# Patient Record
Sex: FEMALE | Race: OTHER | HISPANIC OR LATINO | URBAN - METROPOLITAN AREA
[De-identification: names, ages, dates, MRNs, and addresses within clinical notes are randomized per-mention and may not be internally consistent; named-entity substitution may affect disease eponyms.]

---

## 2018-08-01 ENCOUNTER — EMERGENCY (EMERGENCY)
Facility: HOSPITAL | Age: 33
LOS: 1 days | Discharge: HOME | End: 2018-08-01
Attending: EMERGENCY MEDICINE
Payer: COMMERCIAL

## 2018-08-01 VITALS
RESPIRATION RATE: 18 BRPM | TEMPERATURE: 98 F | OXYGEN SATURATION: 100 % | SYSTOLIC BLOOD PRESSURE: 105 MMHG | HEART RATE: 73 BPM | DIASTOLIC BLOOD PRESSURE: 68 MMHG

## 2018-08-01 LAB
ANION GAP SERPL CALC-SCNC: 6 MMOL/L — SIGNIFICANT CHANGE UP (ref 5–17)
APPEARANCE UR: CLEAR — SIGNIFICANT CHANGE UP
BASOPHILS # BLD AUTO: 0.1 K/UL — SIGNIFICANT CHANGE UP (ref 0–0.2)
BASOPHILS NFR BLD AUTO: 0.7 % — SIGNIFICANT CHANGE UP (ref 0–2)
BILIRUB UR-MCNC: NEGATIVE — SIGNIFICANT CHANGE UP
BUN SERPL-MCNC: 12 MG/DL — SIGNIFICANT CHANGE UP (ref 7–18)
CALCIUM SERPL-MCNC: 9.3 MG/DL — SIGNIFICANT CHANGE UP (ref 8.4–10.5)
CHLORIDE SERPL-SCNC: 104 MMOL/L — SIGNIFICANT CHANGE UP (ref 96–108)
CO2 SERPL-SCNC: 27 MMOL/L — SIGNIFICANT CHANGE UP (ref 22–31)
COLOR SPEC: YELLOW — SIGNIFICANT CHANGE UP
CREAT SERPL-MCNC: 0.66 MG/DL — SIGNIFICANT CHANGE UP (ref 0.5–1.3)
DIFF PNL FLD: ABNORMAL
EOSINOPHIL # BLD AUTO: 0 K/UL — SIGNIFICANT CHANGE UP (ref 0–0.5)
EOSINOPHIL NFR BLD AUTO: 0.3 % — SIGNIFICANT CHANGE UP (ref 0–6)
GLUCOSE SERPL-MCNC: 108 MG/DL — HIGH (ref 70–99)
GLUCOSE UR QL: NEGATIVE — SIGNIFICANT CHANGE UP
HCG SERPL-ACNC: <1 MIU/ML — SIGNIFICANT CHANGE UP
HCT VFR BLD CALC: 38.4 % — SIGNIFICANT CHANGE UP (ref 34.5–45)
HGB BLD-MCNC: 12.6 G/DL — SIGNIFICANT CHANGE UP (ref 11.5–15.5)
KETONES UR-MCNC: NEGATIVE — SIGNIFICANT CHANGE UP
LEUKOCYTE ESTERASE UR-ACNC: NEGATIVE — SIGNIFICANT CHANGE UP
LYMPHOCYTES # BLD AUTO: 1.5 K/UL — SIGNIFICANT CHANGE UP (ref 1–3.3)
LYMPHOCYTES # BLD AUTO: 20.1 % — SIGNIFICANT CHANGE UP (ref 13–44)
MCHC RBC-ENTMCNC: 27.5 PG — SIGNIFICANT CHANGE UP (ref 27–34)
MCHC RBC-ENTMCNC: 32.9 GM/DL — SIGNIFICANT CHANGE UP (ref 32–36)
MCV RBC AUTO: 83.5 FL — SIGNIFICANT CHANGE UP (ref 80–100)
MONOCYTES # BLD AUTO: 0.5 K/UL — SIGNIFICANT CHANGE UP (ref 0–0.9)
MONOCYTES NFR BLD AUTO: 6.4 % — SIGNIFICANT CHANGE UP (ref 2–14)
NEUTROPHILS # BLD AUTO: 5.5 K/UL — SIGNIFICANT CHANGE UP (ref 1.8–7.4)
NEUTROPHILS NFR BLD AUTO: 72.5 % — SIGNIFICANT CHANGE UP (ref 43–77)
NITRITE UR-MCNC: NEGATIVE — SIGNIFICANT CHANGE UP
PH UR: 7 — SIGNIFICANT CHANGE UP (ref 5–8)
PLATELET # BLD AUTO: 212 K/UL — SIGNIFICANT CHANGE UP (ref 150–400)
POTASSIUM SERPL-MCNC: 3.8 MMOL/L — SIGNIFICANT CHANGE UP (ref 3.5–5.3)
POTASSIUM SERPL-SCNC: 3.8 MMOL/L — SIGNIFICANT CHANGE UP (ref 3.5–5.3)
PROT UR-MCNC: NEGATIVE — SIGNIFICANT CHANGE UP
RBC # BLD: 4.59 M/UL — SIGNIFICANT CHANGE UP (ref 3.8–5.2)
RBC # FLD: 12.9 % — SIGNIFICANT CHANGE UP (ref 10.3–14.5)
SODIUM SERPL-SCNC: 137 MMOL/L — SIGNIFICANT CHANGE UP (ref 135–145)
SP GR SPEC: 1 — LOW (ref 1.01–1.02)
TROPONIN I SERPL-MCNC: <0.015 NG/ML — SIGNIFICANT CHANGE UP (ref 0–0.04)
UROBILINOGEN FLD QL: NEGATIVE — SIGNIFICANT CHANGE UP
WBC # BLD: 7.6 K/UL — SIGNIFICANT CHANGE UP (ref 3.8–10.5)
WBC # FLD AUTO: 7.6 K/UL — SIGNIFICANT CHANGE UP (ref 3.8–10.5)

## 2018-08-01 PROCEDURE — 96374 THER/PROPH/DIAG INJ IV PUSH: CPT

## 2018-08-01 PROCEDURE — 84702 CHORIONIC GONADOTROPIN TEST: CPT

## 2018-08-01 PROCEDURE — 71046 X-RAY EXAM CHEST 2 VIEWS: CPT

## 2018-08-01 PROCEDURE — 85027 COMPLETE CBC AUTOMATED: CPT

## 2018-08-01 PROCEDURE — 93005 ELECTROCARDIOGRAM TRACING: CPT

## 2018-08-01 PROCEDURE — 96375 TX/PRO/DX INJ NEW DRUG ADDON: CPT

## 2018-08-01 PROCEDURE — 71046 X-RAY EXAM CHEST 2 VIEWS: CPT | Mod: 26

## 2018-08-01 PROCEDURE — 84484 ASSAY OF TROPONIN QUANT: CPT

## 2018-08-01 PROCEDURE — 99285 EMERGENCY DEPT VISIT HI MDM: CPT | Mod: 25

## 2018-08-01 PROCEDURE — 80048 BASIC METABOLIC PNL TOTAL CA: CPT

## 2018-08-01 PROCEDURE — 82962 GLUCOSE BLOOD TEST: CPT

## 2018-08-01 PROCEDURE — 81001 URINALYSIS AUTO W/SCOPE: CPT

## 2018-08-01 PROCEDURE — 99284 EMERGENCY DEPT VISIT MOD MDM: CPT | Mod: 25

## 2018-08-01 RX ORDER — DIPHENHYDRAMINE HCL 50 MG
25 CAPSULE ORAL ONCE
Qty: 0 | Refills: 0 | Status: COMPLETED | OUTPATIENT
Start: 2018-08-01 | End: 2018-08-01

## 2018-08-01 RX ORDER — KETOROLAC TROMETHAMINE 30 MG/ML
30 SYRINGE (ML) INJECTION ONCE
Qty: 0 | Refills: 0 | Status: DISCONTINUED | OUTPATIENT
Start: 2018-08-01 | End: 2018-08-01

## 2018-08-01 RX ORDER — METOCLOPRAMIDE HCL 10 MG
10 TABLET ORAL ONCE
Qty: 0 | Refills: 0 | Status: COMPLETED | OUTPATIENT
Start: 2018-08-01 | End: 2018-08-01

## 2018-08-01 RX ORDER — SODIUM CHLORIDE 9 MG/ML
1000 INJECTION INTRAMUSCULAR; INTRAVENOUS; SUBCUTANEOUS ONCE
Qty: 0 | Refills: 0 | Status: COMPLETED | OUTPATIENT
Start: 2018-08-01 | End: 2018-08-01

## 2018-08-01 RX ADMIN — Medication 25 MILLIGRAM(S): at 02:06

## 2018-08-01 RX ADMIN — Medication 10 MILLIGRAM(S): at 02:06

## 2018-08-01 RX ADMIN — SODIUM CHLORIDE 1000 MILLILITER(S): 9 INJECTION INTRAMUSCULAR; INTRAVENOUS; SUBCUTANEOUS at 01:02

## 2018-08-01 RX ADMIN — Medication 30 MILLIGRAM(S): at 02:05

## 2018-08-01 RX ADMIN — Medication 30 MILLIGRAM(S): at 02:09

## 2018-08-01 RX ADMIN — SODIUM CHLORIDE 1000 MILLILITER(S): 9 INJECTION INTRAMUSCULAR; INTRAVENOUS; SUBCUTANEOUS at 02:06

## 2018-08-01 NOTE — ED PROVIDER NOTE - OBJECTIVE STATEMENT
34 y/o F with no significant PMHx presents to ED after having an episode of headache and lightheadedness while at work. Pt describes pain to be at the back of her head, gradual onset, not thunderclap in nature, w/ associated nausea, fatigue. Pt denies any vomiting, LOC, shortness of breath, abd pain, urinary or bowel changes, or any other complaints. Pt is with coworker who states she is more comfortable after fluids. Denies history of syncope in past. Pt declines translation services, is opting for translation by friends and family.    32 y/o F with no significant PMHx presents to ED after having an episode of headache and lightheadedness while at work. Pt describes pain to be at the back of her head, gradual onset, not thunderclap in nature, w/ associated nausea, fatigue. Pt denies any vomiting, LOC, shortness of breath, abd pain, urinary or bowel changes, or any other complaints. Pt is with coworker who states she is more comfortable after fluids. Denies history of syncope in past.

## 2018-08-01 NOTE — ED PROVIDER NOTE - MEDICAL DECISION MAKING DETAILS
34 y/o F here w/ headache and lightheadedness. Will evaluate w/ cardiac work up and treat w/ migraine cocktail.

## 2018-08-01 NOTE — ED PROVIDER NOTE - CARE PLAN
Principal Discharge DX:	Acute nonintractable headache, unspecified headache type
Patient declined information

## 2018-08-01 NOTE — ED PROVIDER NOTE - PROGRESS NOTE DETAILS
X-ray was read as a wet read, no obvious pna noted. Patient without any complaints at this time. Patient requesting D/C. Will d/c home with return instructions.

## 2019-10-06 ENCOUNTER — EMERGENCY (EMERGENCY)
Facility: HOSPITAL | Age: 34
LOS: 1 days | Discharge: HOME | End: 2019-10-06
Attending: EMERGENCY MEDICINE
Payer: MEDICAID

## 2019-10-06 VITALS
DIASTOLIC BLOOD PRESSURE: 85 MMHG | SYSTOLIC BLOOD PRESSURE: 125 MMHG | TEMPERATURE: 98 F | RESPIRATION RATE: 16 BRPM | OXYGEN SATURATION: 97 % | HEART RATE: 84 BPM | WEIGHT: 130.95 LBS | HEIGHT: 60 IN

## 2019-10-06 VITALS
DIASTOLIC BLOOD PRESSURE: 68 MMHG | RESPIRATION RATE: 16 BRPM | TEMPERATURE: 98 F | SYSTOLIC BLOOD PRESSURE: 112 MMHG | OXYGEN SATURATION: 98 % | HEART RATE: 68 BPM

## 2019-10-06 LAB
ALBUMIN SERPL ELPH-MCNC: 3.7 G/DL — SIGNIFICANT CHANGE UP (ref 3.5–5)
ALP SERPL-CCNC: 75 U/L — SIGNIFICANT CHANGE UP (ref 40–120)
ALT FLD-CCNC: 20 U/L DA — SIGNIFICANT CHANGE UP (ref 10–60)
ANION GAP SERPL CALC-SCNC: 6 MMOL/L — SIGNIFICANT CHANGE UP (ref 5–17)
APTT BLD: 34 SEC — SIGNIFICANT CHANGE UP (ref 27.5–36.3)
AST SERPL-CCNC: 19 U/L — SIGNIFICANT CHANGE UP (ref 10–40)
BASOPHILS # BLD AUTO: 0 K/UL — SIGNIFICANT CHANGE UP (ref 0–0.2)
BASOPHILS NFR BLD AUTO: 0 % — SIGNIFICANT CHANGE UP (ref 0–2)
BILIRUB SERPL-MCNC: 0.2 MG/DL — SIGNIFICANT CHANGE UP (ref 0.2–1.2)
BUN SERPL-MCNC: 9 MG/DL — SIGNIFICANT CHANGE UP (ref 7–18)
CALCIUM SERPL-MCNC: 9.3 MG/DL — SIGNIFICANT CHANGE UP (ref 8.4–10.5)
CHLORIDE SERPL-SCNC: 107 MMOL/L — SIGNIFICANT CHANGE UP (ref 96–108)
CO2 SERPL-SCNC: 27 MMOL/L — SIGNIFICANT CHANGE UP (ref 22–31)
CREAT SERPL-MCNC: 0.57 MG/DL — SIGNIFICANT CHANGE UP (ref 0.5–1.3)
EOSINOPHIL # BLD AUTO: 0.16 K/UL — SIGNIFICANT CHANGE UP (ref 0–0.5)
EOSINOPHIL NFR BLD AUTO: 2 % — SIGNIFICANT CHANGE UP (ref 0–6)
GLUCOSE SERPL-MCNC: 116 MG/DL — HIGH (ref 70–99)
HCT VFR BLD CALC: 41.2 % — SIGNIFICANT CHANGE UP (ref 34.5–45)
HGB BLD-MCNC: 13.1 G/DL — SIGNIFICANT CHANGE UP (ref 11.5–15.5)
HYPOCHROMIA BLD QL: SLIGHT — SIGNIFICANT CHANGE UP
INR BLD: 1.04 RATIO — SIGNIFICANT CHANGE UP (ref 0.88–1.16)
LIDOCAIN IGE QN: 135 U/L — SIGNIFICANT CHANGE UP (ref 73–393)
LYMPHOCYTES # BLD AUTO: 1.63 K/UL — SIGNIFICANT CHANGE UP (ref 1–3.3)
LYMPHOCYTES # BLD AUTO: 21 % — SIGNIFICANT CHANGE UP (ref 13–44)
MAGNESIUM SERPL-MCNC: 2.4 MG/DL — SIGNIFICANT CHANGE UP (ref 1.6–2.6)
MANUAL SMEAR VERIFICATION: SIGNIFICANT CHANGE UP
MCHC RBC-ENTMCNC: 26.6 PG — LOW (ref 27–34)
MCHC RBC-ENTMCNC: 31.8 GM/DL — LOW (ref 32–36)
MCV RBC AUTO: 83.7 FL — SIGNIFICANT CHANGE UP (ref 80–100)
MONOCYTES # BLD AUTO: 0.62 K/UL — SIGNIFICANT CHANGE UP (ref 0–0.9)
MONOCYTES NFR BLD AUTO: 8 % — SIGNIFICANT CHANGE UP (ref 2–14)
NEUTROPHILS # BLD AUTO: 5.35 K/UL — SIGNIFICANT CHANGE UP (ref 1.8–7.4)
NEUTROPHILS NFR BLD AUTO: 69 % — SIGNIFICANT CHANGE UP (ref 43–77)
NRBC # BLD: 0 /100 — SIGNIFICANT CHANGE UP (ref 0–0)
NT-PROBNP SERPL-SCNC: 14 PG/ML — SIGNIFICANT CHANGE UP (ref 0–125)
PLAT MORPH BLD: NORMAL — SIGNIFICANT CHANGE UP
PLATELET # BLD AUTO: 246 K/UL — SIGNIFICANT CHANGE UP (ref 150–400)
POTASSIUM SERPL-MCNC: 4.2 MMOL/L — SIGNIFICANT CHANGE UP (ref 3.5–5.3)
POTASSIUM SERPL-SCNC: 4.2 MMOL/L — SIGNIFICANT CHANGE UP (ref 3.5–5.3)
PROT SERPL-MCNC: 7.7 G/DL — SIGNIFICANT CHANGE UP (ref 6–8.3)
PROTHROM AB SERPL-ACNC: 11.6 SEC — SIGNIFICANT CHANGE UP (ref 10–12.9)
RBC # BLD: 4.92 M/UL — SIGNIFICANT CHANGE UP (ref 3.8–5.2)
RBC # FLD: 14.1 % — SIGNIFICANT CHANGE UP (ref 10.3–14.5)
RBC BLD AUTO: ABNORMAL
SODIUM SERPL-SCNC: 140 MMOL/L — SIGNIFICANT CHANGE UP (ref 135–145)
TROPONIN I SERPL-MCNC: <0.015 NG/ML — SIGNIFICANT CHANGE UP (ref 0–0.04)
TROPONIN I SERPL-MCNC: <0.015 NG/ML — SIGNIFICANT CHANGE UP (ref 0–0.04)
WBC # BLD: 7.76 K/UL — SIGNIFICANT CHANGE UP (ref 3.8–10.5)
WBC # FLD AUTO: 7.76 K/UL — SIGNIFICANT CHANGE UP (ref 3.8–10.5)

## 2019-10-06 PROCEDURE — 99283 EMERGENCY DEPT VISIT LOW MDM: CPT | Mod: 25

## 2019-10-06 PROCEDURE — 85610 PROTHROMBIN TIME: CPT

## 2019-10-06 PROCEDURE — 93005 ELECTROCARDIOGRAM TRACING: CPT

## 2019-10-06 PROCEDURE — 85730 THROMBOPLASTIN TIME PARTIAL: CPT

## 2019-10-06 PROCEDURE — 93010 ELECTROCARDIOGRAM REPORT: CPT

## 2019-10-06 PROCEDURE — 84484 ASSAY OF TROPONIN QUANT: CPT

## 2019-10-06 PROCEDURE — 83880 ASSAY OF NATRIURETIC PEPTIDE: CPT

## 2019-10-06 PROCEDURE — 80053 COMPREHEN METABOLIC PANEL: CPT

## 2019-10-06 PROCEDURE — 36415 COLL VENOUS BLD VENIPUNCTURE: CPT

## 2019-10-06 PROCEDURE — 99284 EMERGENCY DEPT VISIT MOD MDM: CPT

## 2019-10-06 PROCEDURE — 83735 ASSAY OF MAGNESIUM: CPT

## 2019-10-06 PROCEDURE — 71046 X-RAY EXAM CHEST 2 VIEWS: CPT

## 2019-10-06 PROCEDURE — 83690 ASSAY OF LIPASE: CPT

## 2019-10-06 PROCEDURE — 71046 X-RAY EXAM CHEST 2 VIEWS: CPT | Mod: 26

## 2019-10-06 PROCEDURE — 85027 COMPLETE CBC AUTOMATED: CPT

## 2019-10-06 NOTE — ED PROVIDER NOTE - NSFOLLOWUPCLINICS_GEN_ALL_ED_FT
[facility]  Cardiology  [address] [place] [id number]  Phone: [phone]  Fax:   Follow Up Time:

## 2019-10-06 NOTE — ED PROVIDER NOTE - PATIENT PORTAL LINK FT
You can access the FollowMyHealth Patient Portal offered by [facility] by registering at the following website: [email or web address]. By joining [employer]’s FollowMyHealth portal, you will also be able to view your health information using other applications (apps) compatible with our system.

## 2019-10-06 NOTE — ED PROVIDER NOTE - NSFOLLOWUPINSTRUCTIONS_ED_ALL_ED_FT
FOLLOW UP WITH PMD & CARDIOLOGIST  WITHIN 1-2DAYS, CALL TO MAKE APPOINTMENT  COME BACK TO ED IF YOUR CONDITION WORSENS OR IF YOU DEVELOP FEVER GREATER THAN 100.4F, CHEST PAIN,  SHORTNESS OF BREATH OR ANY OTHER SYMPTOMS CONCERNING TO YOU  TAKE TYLENOL (ACETAMINOPHEN) 650 MG EVERY 6 HOURS AS NEEDED FOR PAIN  TAKE IBUPROFEN (MOTRIN)  600 MG EVERY 6 HOURS AS NEEDED FOR PAIN  IF YOU WERE PRESRCIBED ANY MEDICATIONS FROM TODAY'S VISIT, TAKE THEM AS DIRECTED    Chest Pain    WHAT YOU NEED TO KNOW:    Chest pain can be caused by a range of conditions, from not serious to life-threatening. Chest pain can be a symptom of a digestive problem, such as acid reflux or a stomach ulcer. An anxiety attack or a strong emotion, such as anger, can also cause chest pain. Infection, inflammation, or a fracture in the bones or cartilage in your chest can cause pain or discomfort. Sometimes chest pain or pressure is caused by poor blood flow to your heart (angina). Chest pain may also be caused by life-threatening conditions such as a heart attack or blood clot in your lungs.     DISCHARGE INSTRUCTIONS:    Call 911 if:     You have any of the following signs of a heart attack:   Squeezing, pressure, or pain in your chest       and any of the following:   Discomfort or pain in your back, neck, jaw, stomach, or arm       Shortness of breath      Nausea or vomiting      Lightheadedness or a sudden cold sweat        Return to the emergency department if:     You have chest discomfort that gets worse, even with medicine.      You cough or vomit blood.       Your bowel movements are black or bloody.       You cannot stop vomiting, or it hurts to swallow.     Contact your healthcare provider if:     You have questions or concerns about your condition or care.        Medicines:     Medicines may be given to treat the cause of your chest pain. Examples include pain medicine, anxiety medicine, or medicines to increase blood flow to your heart.       Do not take certain medicines without asking your healthcare provider first. These include NSAIDs, herbal or vitamin supplements, or hormones (estrogen or progestin).       Take your medicine as directed. Contact your healthcare provider if you think your medicine is not helping or if you have side effects. Tell him or her if you are allergic to any medicine. Keep a list of the medicines, vitamins, and herbs you take. Include the amounts, and when and why you take them. Bring the list or the pill bottles to follow-up visits. Carry your medicine list with you in case of an emergency.    Follow up with your healthcare provider within 72 hours, or as directed: You may need to return for more tests to find the cause of your chest pain. You may be referred to a specialist, such as a cardiologist or gastroenterologist. Write down your questions so you remember to ask them during your visits.     Healthy living tips: The following are general healthy guidelines. If your chest pain is caused by a heart problem, your healthcare provider will give you specific guidelines to follow.    Do not smoke. Nicotine and other chemicals in cigarettes and cigars can cause lung and heart damage. Ask your healthcare provider for information if you currently smoke and need help to quit. E-cigarettes or smokeless tobacco still contain nicotine. Talk to your healthcare provider before you use these products.       Eat a variety of healthy, low-fat, low-salt foods. Healthy foods include fruits, vegetables, whole-grain breads, low-fat dairy products, beans, lean meats, and fish. Ask for more information about a heart healthy diet.      Drink plenty of water every day. Your body is made of mostly water. Water helps your body to control your temperature and blood pressure. Ask your healthcare provider how much water you should drink every day.      Ask about activity. Your healthcare provider will tell you which activities to limit or avoid. Ask when you can drive, return to work, and have sex. Ask about the best exercise plan for you.      Maintain a healthy weight. Ask your healthcare provider how much you should weigh. Ask him or her to help you create a weight loss plan if you are overweight.       Get the flu and pneumonia vaccines. All adults should get the influenza (flu) vaccine. Get it every year as soon as it becomes available. The pneumococcal vaccine is given to adults aged 65 years or older. The vaccine is given every 5 years to prevent pneumococcal disease, such as pneumonia.    If you have a stent:     Carry your stent card with you at all times.       Let all healthcare providers know that you have a stent.

## 2019-10-06 NOTE — ED ADULT NURSE NOTE - CHPI ED NUR SYMPTOMS NEG
no fever/no back pain/no congestion/no chest pain/no vomiting/no diaphoresis/no dizziness/no shortness of breath/no chills/no nausea/no syncope

## 2019-10-06 NOTE — ED ADULT NURSE NOTE - NSIMPLEMENTINTERV_GEN_ALL_ED
Implemented All Universal Safety Interventions:  [place] to call system. Call bell, personal items and telephone within reach. Instruct patient to call for assistance. Room bathroom lighting operational. Non-slip footwear when patient is off stretcher. Physically safe environment: no spills, clutter or unnecessary equipment. Stretcher in lowest position, wheels locked, appropriate side rails in place.

## 2019-10-06 NOTE — ED PROVIDER NOTE - CLINICAL SUMMARY MEDICAL DECISION MAKING FREE TEXT BOX
L shoulder & L sided CP likely MSK. LUE & perioral numbness likely from anxiety. given Fhx checked 2 trop which were normal. heart score 2 so will dc w/ cardio f/u. The patient is age less than 50, has an HR less than 100, O2 sat on room air of 95% or greater, no hx of DVT or PE, no trauma or surgery in the last 4 weeks, no hemoptysis, no OCP use, on clinical signs of DVT. According to Pulmonary Embolism Rule-out Criteria, the patient requires no further workup for PE at this time.

## 2019-10-06 NOTE — ED PROVIDER NOTE - OBJECTIVE STATEMENT
Pertinent HPI/PMH/PSH/FHx/SHx and Review of Systems contained within  HPI:  yo  p/w CC CP. around 4-5pm pt was cleaning at bathroom at work developed L sided shoulder & CP. pt became anxious & got LUE & perioral numbness. during this episode no diaphoresis/sob/n/v.  no CP or numbness now. only has L shoulder pain which worsens with movement.    PMH/PSH relevant for:  ROS negative for: fever, SOB, Nausea, vomiting, diarrhea, abdominal pain, dysuria    FamilyHx : dad  of MI at age 56  SocialHx never smoker Pertinent HPI/PMH/PSH/FHx/SHx and Review of Systems contained within  HPI:  34yoF  p/w CC CP. around 4-5pm pt was cleaning at bathroom at work developed L sided shoulder & CP. pt became anxious & got LUE & perioral numbness. during this episode no diaphoresis/sob/n/v.  CP non exertional/non pleuritic. no ocp use. no CP or numbness now. only has L shoulder pain which worsens with movement.    PMH/PSH relevant for:  ROS negative for: fever, SOB, Nausea, vomiting, diarrhea, abdominal pain, dysuria    FamilyHx : dad  of MI at age 56  SocialHx never smoker

## 2019-10-06 NOTE — ED PROVIDER NOTE - PHYSICAL EXAMINATION
*GEN: NAD; well appearing; A+O x3   *HEAD: NC/AT   *EYES/NOSE: PERRL & EOMI b/l  *THROAT: airway patent, moist mucous membranes  *NECK: Neck supple, no masses  *PULMONARY: CTA b/l, symmetric breath sounds.   *CARDIAC: s1s2, regular rhythm, no Murmur  *ABDOMEN:  ND, NT, soft, no guarding, no rebound, no masses   *BACK: no CVA tenderness, Normal  spine   *EXTREMITIES: symmetric pulses, 2+ dp & radial pulses, capillary refill < 2 seconds, no cyanosis, reproducible L shoulder pain w/ movement.    *SKIN: no rash or bruising   *NEUROLOGIC: alert, CN 2-12 intact, moves all 4 extremities, full active & passive ROM in all extremities, normal baseline gait  *PSYCH: insight and judgment nl, memory nl, affect nl, thought nl

## 2019-10-06 NOTE — ED ADULT NURSE NOTE - OBJECTIVE STATEMENT
Pt stated was at work when suddenly started having Lt sided chest pain, non radiating, tingling in both hands and around the mouth  Feels better now

## 2021-08-10 ENCOUNTER — RESULT REVIEW (OUTPATIENT)
Age: 36
End: 2021-08-10

## 2021-12-19 ENCOUNTER — EMERGENCY (EMERGENCY)
Facility: HOSPITAL | Age: 36
LOS: 1 days | Discharge: HOME | End: 2021-12-19
Attending: STUDENT IN AN ORGANIZED HEALTH CARE EDUCATION/TRAINING PROGRAM
Payer: MEDICAID

## 2021-12-19 VITALS
OXYGEN SATURATION: 99 % | DIASTOLIC BLOOD PRESSURE: 66 MMHG | HEART RATE: 80 BPM | RESPIRATION RATE: 18 BRPM | SYSTOLIC BLOOD PRESSURE: 121 MMHG | TEMPERATURE: 98 F

## 2021-12-19 VITALS
RESPIRATION RATE: 16 BRPM | SYSTOLIC BLOOD PRESSURE: 118 MMHG | OXYGEN SATURATION: 98 % | WEIGHT: 138.89 LBS | DIASTOLIC BLOOD PRESSURE: 79 MMHG | HEART RATE: 81 BPM | TEMPERATURE: 98 F | HEIGHT: 60 IN

## 2021-12-19 LAB
ALBUMIN SERPL ELPH-MCNC: 2.6 G/DL — LOW (ref 3.5–5)
ALP SERPL-CCNC: 46 U/L — SIGNIFICANT CHANGE UP (ref 40–120)
ALT FLD-CCNC: 57 U/L DA — SIGNIFICANT CHANGE UP (ref 10–60)
ANION GAP SERPL CALC-SCNC: 4 MMOL/L — LOW (ref 5–17)
AST SERPL-CCNC: 30 U/L — SIGNIFICANT CHANGE UP (ref 10–40)
BASOPHILS # BLD AUTO: 0.03 K/UL — SIGNIFICANT CHANGE UP (ref 0–0.2)
BASOPHILS NFR BLD AUTO: 0.5 % — SIGNIFICANT CHANGE UP (ref 0–2)
BILIRUB SERPL-MCNC: 0.1 MG/DL — LOW (ref 0.2–1.2)
BUN SERPL-MCNC: 12 MG/DL — SIGNIFICANT CHANGE UP (ref 7–18)
CALCIUM SERPL-MCNC: 8.4 MG/DL — SIGNIFICANT CHANGE UP (ref 8.4–10.5)
CHLORIDE SERPL-SCNC: 106 MMOL/L — SIGNIFICANT CHANGE UP (ref 96–108)
CO2 SERPL-SCNC: 28 MMOL/L — SIGNIFICANT CHANGE UP (ref 22–31)
CREAT SERPL-MCNC: 0.55 MG/DL — SIGNIFICANT CHANGE UP (ref 0.5–1.3)
EOSINOPHIL # BLD AUTO: 0.15 K/UL — SIGNIFICANT CHANGE UP (ref 0–0.5)
EOSINOPHIL NFR BLD AUTO: 2.6 % — SIGNIFICANT CHANGE UP (ref 0–6)
GLUCOSE SERPL-MCNC: 104 MG/DL — HIGH (ref 70–99)
HCG SERPL-ACNC: <1 MIU/ML — SIGNIFICANT CHANGE UP
HCT VFR BLD CALC: 37.8 % — SIGNIFICANT CHANGE UP (ref 34.5–45)
HGB BLD-MCNC: 12.5 G/DL — SIGNIFICANT CHANGE UP (ref 11.5–15.5)
IMM GRANULOCYTES NFR BLD AUTO: 0.4 % — SIGNIFICANT CHANGE UP (ref 0–1.5)
LYMPHOCYTES # BLD AUTO: 1.24 K/UL — SIGNIFICANT CHANGE UP (ref 1–3.3)
LYMPHOCYTES # BLD AUTO: 21.8 % — SIGNIFICANT CHANGE UP (ref 13–44)
MCHC RBC-ENTMCNC: 26.9 PG — LOW (ref 27–34)
MCHC RBC-ENTMCNC: 33.1 GM/DL — SIGNIFICANT CHANGE UP (ref 32–36)
MCV RBC AUTO: 81.3 FL — SIGNIFICANT CHANGE UP (ref 80–100)
MONOCYTES # BLD AUTO: 0.63 K/UL — SIGNIFICANT CHANGE UP (ref 0–0.9)
MONOCYTES NFR BLD AUTO: 11.1 % — SIGNIFICANT CHANGE UP (ref 2–14)
NEUTROPHILS # BLD AUTO: 3.61 K/UL — SIGNIFICANT CHANGE UP (ref 1.8–7.4)
NEUTROPHILS NFR BLD AUTO: 63.6 % — SIGNIFICANT CHANGE UP (ref 43–77)
NRBC # BLD: 0 /100 WBCS — SIGNIFICANT CHANGE UP (ref 0–0)
PLATELET # BLD AUTO: 204 K/UL — SIGNIFICANT CHANGE UP (ref 150–400)
POTASSIUM SERPL-MCNC: 4.2 MMOL/L — SIGNIFICANT CHANGE UP (ref 3.5–5.3)
POTASSIUM SERPL-SCNC: 4.2 MMOL/L — SIGNIFICANT CHANGE UP (ref 3.5–5.3)
PROT SERPL-MCNC: 7.1 G/DL — SIGNIFICANT CHANGE UP (ref 6–8.3)
RBC # BLD: 4.65 M/UL — SIGNIFICANT CHANGE UP (ref 3.8–5.2)
RBC # FLD: 15.5 % — HIGH (ref 10.3–14.5)
SARS-COV-2 RNA SPEC QL NAA+PROBE: DETECTED
SODIUM SERPL-SCNC: 138 MMOL/L — SIGNIFICANT CHANGE UP (ref 135–145)
WBC # BLD: 5.68 K/UL — SIGNIFICANT CHANGE UP (ref 3.8–10.5)
WBC # FLD AUTO: 5.68 K/UL — SIGNIFICANT CHANGE UP (ref 3.8–10.5)

## 2021-12-19 PROCEDURE — 36415 COLL VENOUS BLD VENIPUNCTURE: CPT

## 2021-12-19 PROCEDURE — 99284 EMERGENCY DEPT VISIT MOD MDM: CPT | Mod: 25

## 2021-12-19 PROCEDURE — 87635 SARS-COV-2 COVID-19 AMP PRB: CPT

## 2021-12-19 PROCEDURE — 96375 TX/PRO/DX INJ NEW DRUG ADDON: CPT | Mod: XU

## 2021-12-19 PROCEDURE — 80053 COMPREHEN METABOLIC PANEL: CPT

## 2021-12-19 PROCEDURE — 70491 CT SOFT TISSUE NECK W/DYE: CPT | Mod: 26,MA

## 2021-12-19 PROCEDURE — 96374 THER/PROPH/DIAG INJ IV PUSH: CPT | Mod: XU

## 2021-12-19 PROCEDURE — 99285 EMERGENCY DEPT VISIT HI MDM: CPT

## 2021-12-19 PROCEDURE — 70491 CT SOFT TISSUE NECK W/DYE: CPT | Mod: MA

## 2021-12-19 PROCEDURE — 84702 CHORIONIC GONADOTROPIN TEST: CPT

## 2021-12-19 PROCEDURE — 85025 COMPLETE CBC W/AUTO DIFF WBC: CPT

## 2021-12-19 RX ORDER — KETOROLAC TROMETHAMINE 30 MG/ML
15 SYRINGE (ML) INJECTION ONCE
Refills: 0 | Status: DISCONTINUED | OUTPATIENT
Start: 2021-12-19 | End: 2021-12-19

## 2021-12-19 RX ORDER — DEXAMETHASONE 0.5 MG/5ML
6 ELIXIR ORAL ONCE
Refills: 0 | Status: COMPLETED | OUTPATIENT
Start: 2021-12-19 | End: 2021-12-19

## 2021-12-19 RX ADMIN — Medication 15 MILLIGRAM(S): at 05:15

## 2021-12-19 RX ADMIN — Medication 6 MILLIGRAM(S): at 05:15

## 2021-12-19 NOTE — ED PROVIDER NOTE - OBJECTIVE STATEMENT
36F with no pertinent PMH p/w runny nose, sore throat, cough x 2 days. Associated with difficulty swallowing her saliva due to pain. Denies any other symptoms including fever, HA, neck stiffness, chest pain, SOB, abd pain, N/V/D. Vaccinated for COVID.

## 2021-12-19 NOTE — ED PROVIDER NOTE - PHYSICAL EXAMINATION
Gen: AAOx3, non-toxic  Head: NCAT  HEENT: no tonsilar swelling or exudate, no uvular swelling or deviation, no obvious PTA, erythematous posterior oropharynx, EOMI, oral mucosa moist, normal conjunctiva  Lung: CTAB, no respiratory distress, no wheezes/rhonchi/rales B/L, speaking in full sentences  CV: RRR, no murmurs, rubs or gallops  Abd: soft, NTND, no guarding, no CVA tenderness  MSK: no visible deformities  Neuro: No focal sensory or motor deficits, normal CN exam   Skin: Warm, well perfused, no rash  Psych: normal affect.     Rey Carpio, DO

## 2021-12-19 NOTE — ED PROVIDER NOTE - NSFOLLOWUPINSTRUCTIONS_ED_ALL_ED_FT
Follow up with your PCP in 24-48 hours.   May take Tylenol and Motrin as directed on the bottle for pain control.   Return to the ER if you develop any new or worsening symptoms such as trouble swallowing/breathing, chest pain, shortness of breath, numbness, weakness, abdominal pain, nausea, vomiting, or visual changes. Follow up with your PCP in 24-48 hours.   May take Tylenol and Motrin as directed on the bottle for pain control.   Return to the ER if you develop any new or worsening symptoms such as oxygen below 90%, trouble swallowing/breathing, chest pain, shortness of breath, numbness, weakness, abdominal pain, nausea, vomiting, or visual changes.    Simi un seguimiento con lambert PCP en 24 a 48 horas.  Puede huey Tylenol y Motrin feli se indica en el frasco para controlar el dolor.  Regrese a la javier de emergencias si presenta síntomas nuevos o que empeoran, feli oxígeno por debajo del 90%, dificultad para tragar / respirar, dolor de pecho, dificultad para respirar, entumecimiento, debilidad, dolor abdominal, náuseas, vómitos o cambios visuales.

## 2021-12-19 NOTE — ED PROVIDER NOTE - NS ED ROS FT
ROS:  GENERAL: No fever, no chills  EYES: no change in vision  HEENT: +sore throat   CARDIAC: no chest pain  PULMONARY: +cough, no shortness of breath  GI: no abdominal pain, no nausea, no vomiting, no diarrhea, no constipation  : No dysuria, no frequency, no change in appearance, or odor of urine  SKIN: no rashes  NEURO: no headache, no weakness  MSK: No joint pain    Rey Carpio DO

## 2021-12-19 NOTE — ED PROVIDER NOTE - PATIENT PORTAL LINK FT
You can access the FollowMyHealth Patient Portal offered by [facility] by registering at the following website: [email or web address]. By joining [employer]’s FollowMyHealth portal, you will also be able to view your health information using other applications (apps) compatible with our system.

## 2021-12-19 NOTE — ED PROVIDER NOTE - CARE PLAN
1 Principal Discharge DX:	Sore throat   Principal Discharge DX:	Sore throat  Secondary Diagnosis:	2019 novel coronavirus disease (COVID-19)

## 2021-12-19 NOTE — ED PROVIDER NOTE - CLINICAL SUMMARY MEDICAL DECISION MAKING FREE TEXT BOX
36F with no pertinent PMH p/w runny nose, sore throat, cough x 2 days. Denies any other symptoms. Pt well appearing, erythematous posterior oropharynx but otherwise unremarkable exam. Viral pharyngitis most likely but with eval for deep tissue infection given difficulty swallowing.

## 2021-12-29 ENCOUNTER — RESULT REVIEW (OUTPATIENT)
Age: 36
End: 2021-12-29

## 2022-03-31 ENCOUNTER — EMERGENCY (EMERGENCY)
Facility: HOSPITAL | Age: 37
LOS: 1 days | Discharge: HOME | End: 2022-03-31
Attending: STUDENT IN AN ORGANIZED HEALTH CARE EDUCATION/TRAINING PROGRAM
Payer: MEDICAID

## 2022-03-31 VITALS
RESPIRATION RATE: 17 BRPM | OXYGEN SATURATION: 100 % | SYSTOLIC BLOOD PRESSURE: 123 MMHG | TEMPERATURE: 99 F | HEART RATE: 89 BPM | DIASTOLIC BLOOD PRESSURE: 84 MMHG

## 2022-03-31 VITALS
WEIGHT: 136.47 LBS | RESPIRATION RATE: 16 BRPM | HEART RATE: 97 BPM | SYSTOLIC BLOOD PRESSURE: 130 MMHG | HEIGHT: 60 IN | TEMPERATURE: 98 F | DIASTOLIC BLOOD PRESSURE: 86 MMHG | OXYGEN SATURATION: 100 %

## 2022-03-31 LAB
ALBUMIN SERPL ELPH-MCNC: 3.3 G/DL — LOW (ref 3.5–5)
ALP SERPL-CCNC: 50 U/L — SIGNIFICANT CHANGE UP (ref 40–120)
ALT FLD-CCNC: 22 U/L DA — SIGNIFICANT CHANGE UP (ref 10–60)
ANION GAP SERPL CALC-SCNC: 6 MMOL/L — SIGNIFICANT CHANGE UP (ref 5–17)
APTT BLD: 30.2 SEC — SIGNIFICANT CHANGE UP (ref 27.5–35.5)
AST SERPL-CCNC: 12 U/L — SIGNIFICANT CHANGE UP (ref 10–40)
BASOPHILS # BLD AUTO: 0.04 K/UL — SIGNIFICANT CHANGE UP (ref 0–0.2)
BASOPHILS NFR BLD AUTO: 0.6 % — SIGNIFICANT CHANGE UP (ref 0–2)
BILIRUB SERPL-MCNC: 0.2 MG/DL — SIGNIFICANT CHANGE UP (ref 0.2–1.2)
BUN SERPL-MCNC: 9 MG/DL — SIGNIFICANT CHANGE UP (ref 7–18)
CALCIUM SERPL-MCNC: 8.4 MG/DL — SIGNIFICANT CHANGE UP (ref 8.4–10.5)
CHLORIDE SERPL-SCNC: 112 MMOL/L — HIGH (ref 96–108)
CO2 SERPL-SCNC: 24 MMOL/L — SIGNIFICANT CHANGE UP (ref 22–31)
CREAT SERPL-MCNC: 0.68 MG/DL — SIGNIFICANT CHANGE UP (ref 0.5–1.3)
EGFR: 115 ML/MIN/1.73M2 — SIGNIFICANT CHANGE UP
EOSINOPHIL # BLD AUTO: 0.03 K/UL — SIGNIFICANT CHANGE UP (ref 0–0.5)
EOSINOPHIL NFR BLD AUTO: 0.4 % — SIGNIFICANT CHANGE UP (ref 0–6)
GLUCOSE SERPL-MCNC: 93 MG/DL — SIGNIFICANT CHANGE UP (ref 70–99)
HCG SERPL-ACNC: <1 MIU/ML — SIGNIFICANT CHANGE UP
HCT VFR BLD CALC: 37.1 % — SIGNIFICANT CHANGE UP (ref 34.5–45)
HGB BLD-MCNC: 12.2 G/DL — SIGNIFICANT CHANGE UP (ref 11.5–15.5)
IMM GRANULOCYTES NFR BLD AUTO: 0.1 % — SIGNIFICANT CHANGE UP (ref 0–1.5)
INR BLD: 0.96 RATIO — SIGNIFICANT CHANGE UP (ref 0.88–1.16)
LYMPHOCYTES # BLD AUTO: 1.36 K/UL — SIGNIFICANT CHANGE UP (ref 1–3.3)
LYMPHOCYTES # BLD AUTO: 20.3 % — SIGNIFICANT CHANGE UP (ref 13–44)
MCHC RBC-ENTMCNC: 26.5 PG — LOW (ref 27–34)
MCHC RBC-ENTMCNC: 32.9 GM/DL — SIGNIFICANT CHANGE UP (ref 32–36)
MCV RBC AUTO: 80.5 FL — SIGNIFICANT CHANGE UP (ref 80–100)
MONOCYTES # BLD AUTO: 0.49 K/UL — SIGNIFICANT CHANGE UP (ref 0–0.9)
MONOCYTES NFR BLD AUTO: 7.3 % — SIGNIFICANT CHANGE UP (ref 2–14)
NEUTROPHILS # BLD AUTO: 4.78 K/UL — SIGNIFICANT CHANGE UP (ref 1.8–7.4)
NEUTROPHILS NFR BLD AUTO: 71.3 % — SIGNIFICANT CHANGE UP (ref 43–77)
NRBC # BLD: 0 /100 WBCS — SIGNIFICANT CHANGE UP (ref 0–0)
PLATELET # BLD AUTO: 231 K/UL — SIGNIFICANT CHANGE UP (ref 150–400)
POTASSIUM SERPL-MCNC: 3.9 MMOL/L — SIGNIFICANT CHANGE UP (ref 3.5–5.3)
POTASSIUM SERPL-SCNC: 3.9 MMOL/L — SIGNIFICANT CHANGE UP (ref 3.5–5.3)
PROT SERPL-MCNC: 7.3 G/DL — SIGNIFICANT CHANGE UP (ref 6–8.3)
PROTHROM AB SERPL-ACNC: 11.4 SEC — SIGNIFICANT CHANGE UP (ref 10.5–13.4)
RBC # BLD: 4.61 M/UL — SIGNIFICANT CHANGE UP (ref 3.8–5.2)
RBC # FLD: 14.6 % — HIGH (ref 10.3–14.5)
SODIUM SERPL-SCNC: 142 MMOL/L — SIGNIFICANT CHANGE UP (ref 135–145)
WBC # BLD: 6.71 K/UL — SIGNIFICANT CHANGE UP (ref 3.8–10.5)
WBC # FLD AUTO: 6.71 K/UL — SIGNIFICANT CHANGE UP (ref 3.8–10.5)

## 2022-03-31 PROCEDURE — 85730 THROMBOPLASTIN TIME PARTIAL: CPT

## 2022-03-31 PROCEDURE — 82962 GLUCOSE BLOOD TEST: CPT

## 2022-03-31 PROCEDURE — 99284 EMERGENCY DEPT VISIT MOD MDM: CPT

## 2022-03-31 PROCEDURE — 85025 COMPLETE CBC W/AUTO DIFF WBC: CPT

## 2022-03-31 PROCEDURE — 85610 PROTHROMBIN TIME: CPT

## 2022-03-31 PROCEDURE — 93005 ELECTROCARDIOGRAM TRACING: CPT

## 2022-03-31 PROCEDURE — 93010 ELECTROCARDIOGRAM REPORT: CPT

## 2022-03-31 PROCEDURE — 99283 EMERGENCY DEPT VISIT LOW MDM: CPT

## 2022-03-31 PROCEDURE — 36415 COLL VENOUS BLD VENIPUNCTURE: CPT

## 2022-03-31 PROCEDURE — 80053 COMPREHEN METABOLIC PANEL: CPT

## 2022-03-31 PROCEDURE — 84702 CHORIONIC GONADOTROPIN TEST: CPT

## 2022-03-31 NOTE — ED PROVIDER NOTE - NSFOLLOWUPINSTRUCTIONS_ED_ALL_ED_FT
Paresthesia      La parestesia es daniel sensación de ardor o picazón fuera de lo normal. Suele sentirse en las angel, los brazos, las piernas o los pies. Sin embargo, puede manifestarse en cualquier parte del cuerpo. Por lo general, la parestesia no es dolorosa. Se puede sentir feli lo siguiente:  •Hormigueo o entumecimiento.      •Vibración.      •Picazón.      La parestesia puede producirse sin causa aparente, o puede ser provocada por:  •Respirar demasiado rápido (hiperventilación).      •Presión en un nervio.      •Daniel afección médica subyacente.      •Efectos secundarios de un medicamento.      •Deficiencias nutricionales.      •Exposición a sustancias químicas.      La mayoría de las personas sienten parestesia temporal en algún momento de la swathi. Para algunas personas, puede ser a racheal plazo (crónica) debido a daniel afección médica subyacente. Si tiene parestesia por mucho tiempo, es necesario que el médico le deanna daniel evaluación.      Siga estas instrucciones en lambert casa:      Consumo de alcohol    • No kwasi alcohol si:  •Lambert médico le indica no hacerlo.      •Está embarazada, puede estar embarazada o está tratando de quedar embarazada.      •Si christian alcohol:•Limite la cantidad que christian:  •De 0 a 1 medida por día para las mujeres.      •De 0 a 2 medidas por día para los hombres.        •Esté atento a la cantidad de alcohol que hay en las bebidas que carmen. En los [place], daniel medida equivale a daniel botella de cerveza de 12 oz (355 ml), un vaso de vino de 5 oz (148 ml) o un vaso de daniel bebida alcohólica de renee graduación de 1½ oz (44 ml).          Nutrición    •Siga daniel dieta saludable. [place] incluye:  •Consumir alimentos ricos en fibra, feli frutas y verduras frescas, cereales integrales y frijoles.      •Limitar el consumo de alimentos ricos en grasas y azúcares procesados, feli los alimentos fritos o dulces.        Indicaciones generales     •Use los medicamentos de venta joyce y los recetados solamente feli se lo haya indicado el médico.      • No consuma ningún producto que contenga nicotina o tabaco, feli cigarrillos y cigarrillos electrónicos. Estos productos evitan que la juan llegue a los nervios dañados. Si necesita ayuda para dejar de consumir estos productos, consulte al médico.      •Si tiene diabetes, trabaje estrechamente con el médico para mantener bajo control el nivel de azúcar en la juan.    •Si siente adormecimiento en los pies, deanna lo siguiente:  •Realice controles todos los días para detectar signos de lesión o infección. Observe señales de enrojecimiento, calor e hinchazón.      •Use calcetines acolchados y zapatos cómodos. Estos ayudan a proteger los pies.        •Concurra a todas las visitas de seguimiento feli se lo haya indicado el médico. [place] es importante.        Comuníquese con un médico si:    •Tiene parestesia que empeora o no desaparece.      •Siente adormecimiento después de daniel lesión.      •La sensación de ardor o picazón empeora al caminar.      •Tiene dolor, calambres o mareos, o se desmaya.      •Aparece daniel erupción cutánea.        Solicite ayuda de inmediato si:    •Siente debilidad muscular.      •Comienza a tener debilidad en un brazo o daniel pierna.      •Tiene dificultad para caminar o moverse.      •Tiene problemas con el habla, la comprensión o la visión.      •Se siente confundido.      •No puede controlar la función de la vejiga o los intestinos.        Resumen    •La parestesia es daniel sensación de ardor o picazón fuera de lo normal que generalmente se siente en las angel, los brazos, las piernas o los pies. También pueden producirse en otras partes del cuerpo.      •La parestesia puede producirse sin causa aparente, o puede deberse a respirar muy rápidamente (hiperventilación), la presión en un nervio, daniel afección médica subyacente, los efectos secundarios de un medicamento, deficiencias nutricionales o exposición a sustancias tóxicas.      •Si tiene parestesia por mucho tiempo, es necesario que el médico le deanna daniel evaluación.      Esta información no tiene feli fin reemplazar el consejo del médico. Asegúrese de hacerle al médico cualquier pregunta que tenga.

## 2022-03-31 NOTE — ED PROVIDER NOTE - NSFOLLOWUPCLINICS_GEN_ALL_ED_FT
[facility]  Neurology  [address] [place] [address]  Phone: [phone]  Fax: [phone]

## 2022-03-31 NOTE — ED ADULT TRIAGE NOTE - CHIEF COMPLAINT QUOTE
numbness/ tingling sensation of mouth/ tongue x 1 week, + numbness in the tyshawn arm/ legs x 1 month, has been seeing with neurologist with no improvement

## 2022-03-31 NOTE — ED PROVIDER NOTE - CLINICAL SUMMARY MEDICAL DECISION MAKING FREE TEXT BOX
PAtient presenting with parasthesia, symptoms presenting x 2 weeks to 1 month. neuro intact. will obtain lab, assess lyte, ed obs and reassess. given neuro intact, low suspicion for stroke given focality of symptoms and no observable finding

## 2022-03-31 NOTE — ED PROVIDER NOTE - PROGRESS NOTE DETAILS
patient lab wnl. neuro remains intact. well appearing. given neuro f.u info, return precautions instructed. patient hemodynamically stable on dc

## 2022-03-31 NOTE — ED PROVIDER NOTE - OBJECTIVE STATEMENT
35 y/o presenting with numbness to b/l feet, hand and tongue intermittently. denies n, v, focal weakness, headache, cp, sob. endorses that numbness feels like cramping, not lost of taste or sensation.

## 2022-03-31 NOTE — ED PROVIDER NOTE - CPE EDP CARDIAC NORM
Returned from ultrasound, vides cath removed, patient is in pleasant mood at this time     Conchita Ramsey, [facility]  01/29/21 2059 normal...

## 2022-03-31 NOTE — ED PROVIDER NOTE - PATIENT PORTAL LINK FT
You can access the FollowMyHealth Patient Portal offered by [facility] by registering at the following website: [email or web address]. By joining [employer]’s FollowMyHealth portal, you will also be able to view your health information using other applications (apps) compatible with our system.

## 2022-04-26 NOTE — ED PROVIDER NOTE - NS_ATTENDINGSCRIBE_ED_ALL_ED
PAST SURGICAL HISTORY:  No significant past surgical history       
I personally performed the service described in the documentation recorded by the scribe in my presence, and it accurately and completely records my words and actions.

## 2022-06-28 ENCOUNTER — NON-APPOINTMENT (OUTPATIENT)
Age: 37
End: 2022-06-28

## 2022-07-28 ENCOUNTER — APPOINTMENT (OUTPATIENT)
Dept: BARIATRICS | Facility: CLINIC | Age: 37
End: 2022-07-28

## 2022-07-28 VITALS
HEART RATE: 77 BPM | WEIGHT: 146.5 LBS | TEMPERATURE: 97.3 F | HEIGHT: 59 IN | DIASTOLIC BLOOD PRESSURE: 85 MMHG | SYSTOLIC BLOOD PRESSURE: 125 MMHG | BODY MASS INDEX: 29.53 KG/M2 | OXYGEN SATURATION: 99 %

## 2022-07-28 PROCEDURE — 99204 OFFICE O/P NEW MOD 45 MIN: CPT

## 2022-07-28 NOTE — HISTORY OF PRESENT ILLNESS
[de-identified] : Pt is a 36 y/o F with pmhx of being overweight, BMI 29, prediabetes, who presents today feeling well here for evaluation for wt loss sx. Pt current wt is 146lbs. States she has struggled with her wt for several months trying to lose wt without success. Explained to pt the minimum BMI requirement for wt loss surgery is 35,pt understands. Discussed other options to help the pt with her wt loss including medical wt loss with our endocrinology clinic. Pt states she has tried working with nutrition, exercise, without success and she expresses great frustration. Discussed the option of injection wt loss meds and pt agrees to proceed with a referral today to endocrinology for medical wt loss.

## 2022-09-09 ENCOUNTER — APPOINTMENT (OUTPATIENT)
Dept: ENDOCRINOLOGY | Facility: CLINIC | Age: 37
End: 2022-09-09

## 2022-09-09 VITALS
WEIGHT: 150 LBS | DIASTOLIC BLOOD PRESSURE: 87 MMHG | OXYGEN SATURATION: 98 % | HEIGHT: 59 IN | BODY MASS INDEX: 30.24 KG/M2 | HEART RATE: 92 BPM | TEMPERATURE: 97.3 F | SYSTOLIC BLOOD PRESSURE: 125 MMHG

## 2022-09-09 DIAGNOSIS — R73.03 PREDIABETES.: ICD-10-CM

## 2022-09-09 DIAGNOSIS — Z78.9 OTHER SPECIFIED HEALTH STATUS: ICD-10-CM

## 2022-09-09 DIAGNOSIS — Z82.49 FAMILY HISTORY OF ISCHEMIC HEART DISEASE AND OTHER DISEASES OF THE CIRCULATORY SYSTEM: ICD-10-CM

## 2022-09-09 PROCEDURE — T1013A: CUSTOM

## 2022-09-09 PROCEDURE — 99204 OFFICE O/P NEW MOD 45 MIN: CPT

## 2022-09-09 NOTE — HISTORY OF PRESENT ILLNESS
[FreeTextEntry1] : Patient is a 38 yo woman here for weight consultation and reported hx of prediabets. Utilizing language line  services.\par \par States that she has had difficulties with weight for 17 years.  She has tried everything including going to the gym and diet products. Patient went to a nutritionist and states it doesn't work for her.  States she has increased hunger.  Reports she has tried eating salads and chicken breast with fish but nothing is working.  Patient has tried medicines for weight loss but states it has not worked for her.  Has tried other medicine but with no success, most recently phentermine\par Breakfast: boiled egg, slice of bread, coffee without sugar; oatmeal, yogurt and fruit\par Lunch: salads, chicken breast, vegetables\par Dinner: baked chicken\par Does not go to restaurants\par No soda, no juice, denies having fast food.  Drinks water only\par Exercise: does [employer] but she injured her left leg\par Periods are regular\par Denies plans for conception; hx of tubal ligation\par States she has a diagnosis of prediabetes\par Denies family or personal hx of pancreatic/thyroid pathologies

## 2022-09-09 NOTE — REVIEW OF SYSTEMS
[Fatigue] : fatigue [SOB on Exertion] : shortness of breath on exertion [Constipation] : constipation [Insomnia] : insomnia [Decreased Appetite] : appetite not decreased [Dysphagia] : no dysphagia [Dysphonia] : no dysphonia [Chest Pain] : no chest pain [Slow Heart Rate] : heart rate is not slow [Palpitations] : no palpitations [Fast Heart Rate] : heart rate is not fast [Shortness Of Breath] : no shortness of breath [Cough] : no cough [Nausea] : no nausea [Vomiting] : no vomiting [Diarrhea] : no diarrhea [As Noted in HPI] : as noted in HPI [Joint Pain] : joint pain [Headaches] : no headaches [Tremors] : no tremors [Depression] : no depression [Cold Intolerance] : no cold intolerance [Heat Intolerance] : no heat intolerance

## 2022-09-09 NOTE — PHYSICAL EXAM
[Alert] : alert [Well Nourished] : well nourished [No Acute Distress] : no acute distress [EOMI] : extra ocular movement intact [Normal Hearing] : hearing was normal [Thyroid Not Enlarged] : the thyroid was not enlarged [No Respiratory Distress] : no respiratory distress [No Accessory Muscle Use] : no accessory muscle use [Clear to Auscultation] : lungs were clear to auscultation bilaterally [Normal S1, S2] : normal S1 and S2 [Normal Rate] : heart rate was normal [Normal Bowel Sounds] : normal bowel sounds [Not Tender] : non-tender [Soft] : abdomen soft [Healthy Appearance] : healthy appearance [No Motor Deficits] : the motor exam was normal [Normal Affect] : the affect was normal [Normal Insight/Judgement] : insight and judgment were intact [Normal Mood] : the mood was normal [de-identified] : left leg cast

## 2022-09-09 NOTE — ASSESSMENT
[Weight Loss] : weight loss [FreeTextEntry1] : Patient is a 38 yo woman here for weight consultation\par \par BMI 30/Class I obesity\par Patient states struggles with weight loss over a long period of time. Has attempted various diet with no significant, sustained weight loss.  Food recall is reportedly healthy with no fast food/snacks/sweets\par -discussed concept of calorie restriction\par -for moderate weight loss of 1 lb/week, patient requires about 1250 calories per day.  Can use a fitness tracker or an lázaro to record calories\par -discussed various diets including IF, keto, Atkins, vegan.  Educated that extreme diets/cleanses/very restrictive meals programs may work but are difficult to sustain in the long-run.\par -medical management including metformin (use off label), Qsymia, Contrave, Saxenda were discussed. Most medicines are cost limiting. She has tried phentermine without sustained weight loss.  She is frustrated and does not really want medicines.  Her goal is to have surgery but she did not meet criteria for weight loss surgery\par -will check A1c and TFT's to screen for diabetes and hypothyroidism\par -she is amenable to a trial of GLP1 agonist. Rx for Saxenda sent to pharmacy. Gi side effects including nausea/abdominal pain/vomiting discussed. She denies personal/family hx of MEN/MTC\par \par Patient was educated that the requirements for weight loss surgery are strict.  Requirements include BMI > 35 with on related comorbidities or > 40\par \par

## 2022-09-09 NOTE — REASON FOR VISIT
[Initial Evaluation] : an initial evaluation [Weight Management/Obesity] : weight management/obesity [Other: ______] : provided by GILLIAN [Time Spent: ____ minutes] : Total time spent using  services: [unfilled] minutes. The patient's primary language is not English thus required  services. [Interpreters_IDNumber] : [id number] [TWNoteComboBox1] : Somali

## 2022-09-09 NOTE — CONSULT LETTER
[Dear  ___] : Dear  [unfilled], [Consult Letter:] : I had the pleasure of evaluating your patient, [unfilled]. [Please see my note below.] : Please see my note below. [Consult Closing:] : Thank you very much for allowing me to participate in the care of this patient.  If you have any questions, please do not hesitate to contact me. [Sincerely,] : Sincerely, [FreeTextEntry3] : Valencia Olivera MD

## 2022-09-12 ENCOUNTER — NON-APPOINTMENT (OUTPATIENT)
Age: 37
End: 2022-09-12

## 2022-09-12 LAB
ESTIMATED AVERAGE GLUCOSE: 126 MG/DL
HBA1C MFR BLD HPLC: 6 %
T4 FREE SERPL-MCNC: 1.9 NG/DL
TSH SERPL-ACNC: 0.02 UIU/ML

## 2022-09-13 RX ORDER — METOPROLOL TARTRATE 25 MG/1
25 TABLET, FILM COATED ORAL
Qty: 90 | Refills: 2 | Status: ACTIVE | COMMUNITY
Start: 2022-09-12 | End: 1900-01-01

## 2022-09-16 LAB
T3 SERPL-MCNC: 198 NG/DL
T4 FREE SERPL-MCNC: 2 NG/DL
THYROGLOB AB SERPL-ACNC: 81.2 IU/ML
THYROPEROXIDASE AB SERPL IA-ACNC: 853 IU/ML
TSH SERPL-ACNC: <0.01 UIU/ML

## 2022-09-16 RX ORDER — LIRAGLUTIDE 6 MG/ML
18 INJECTION, SOLUTION SUBCUTANEOUS
Qty: 5 | Refills: 3 | Status: DISCONTINUED | COMMUNITY
Start: 2022-09-09 | End: 2022-09-16

## 2022-09-19 LAB
TSH RECEPTOR AB: <1.1 IU/L
TSI ACT/NOR SER: <0.1 IU/L

## 2022-11-07 ENCOUNTER — APPOINTMENT (OUTPATIENT)
Dept: ENDOCRINOLOGY | Facility: CLINIC | Age: 37
End: 2022-11-07

## 2022-11-07 VITALS
DIASTOLIC BLOOD PRESSURE: 82 MMHG | TEMPERATURE: 97.7 F | BODY MASS INDEX: 35.64 KG/M2 | SYSTOLIC BLOOD PRESSURE: 119 MMHG | OXYGEN SATURATION: 99 % | HEART RATE: 73 BPM | HEIGHT: 55 IN | WEIGHT: 154 LBS

## 2022-11-07 VITALS — BODY MASS INDEX: 31.04 KG/M2 | HEIGHT: 59 IN | WEIGHT: 154 LBS

## 2022-11-07 DIAGNOSIS — E05.90 THYROTOXICOSIS, UNSPECIFIED W/OUT THYROTOXIC CRISIS OR STORM: ICD-10-CM

## 2022-11-07 PROCEDURE — T1013A: CUSTOM

## 2022-11-07 PROCEDURE — 99214 OFFICE O/P EST MOD 30 MIN: CPT

## 2022-11-07 RX ORDER — TOPIRAMATE 50 MG/1
50 TABLET, FILM COATED ORAL
Qty: 60 | Refills: 0 | Status: DISCONTINUED | COMMUNITY
Start: 2022-10-19 | End: 2022-11-07

## 2022-11-07 NOTE — REVIEW OF SYSTEMS
[Fatigue] : fatigue [Recent Weight Gain (___ Lbs)] : recent weight gain: [unfilled] lbs [As Noted in HPI] : as noted in HPI [Dysphagia] : no dysphagia [Dysphonia] : no dysphonia [Shortness Of Breath] : no shortness of breath [Nausea] : no nausea [Constipation] : no constipation [Vomiting] : no vomiting [Diarrhea] : no diarrhea [Headaches] : no headaches [Tremors] : no tremors [Depression] : no depression [Cold Intolerance] : no cold intolerance [Heat Intolerance] : no heat intolerance

## 2022-11-07 NOTE — HISTORY OF PRESENT ILLNESS
[FreeTextEntry1] : Patient is a 36 yo woman here for hyperthyroidism\par \par States that she has had difficulties with weight for 17 years. She has tried everything including going to the gym and diet products. Patient went to a nutritionist and states it doesn't work for her. States she has increased hunger. Reports she has tried eating salads and chicken breast with fish but nothing is working. Patient has tried medicines for weight loss but states it has not worked for her. Has tried other medicine but with no success, most recently phentermine\par Endocrine consultation was had here September 2022 at which point TSH level was checked to evaluate for hypothyroidism in the setting of weight gain. 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  Methimazole 5 mg was prescribed\par Reports having aches in her legs, weight gain.  Feels legs are getting swollen as well\par Patient is upset.  States that she came here and nothing has been done for weight. She wants to see another bariatric surgeon.  States phentermine did not work for her. Topirimate was listed in the AEHR as a medicine but states she was never prescribed that but also doesn't want to take it because it doesn't work for her.?\par

## 2022-11-07 NOTE — ASSESSMENT
[FreeTextEntry1] : Patient is a 36 yo woman here for hyperthyroidism and weight\par \par 1. Hyperthyroidism\par -patient states she's taking the methimazole and metoprolol but still has weight gain\par -states she has ankle swelling and feels these medicines are not that helpful\par -will repeat lab work today and adjust Lt4 based on the results\par \par 2. Weight\par -patient is very upset and angry that nothing is being done for her weight. She reports having taking phentermine in the past but it did not work for her. Has tried other weight loss medicines as well (unsure of names).  When I asked about the topiramate that was in the AEHR states she never took it.  I then asked if she would like a trial of it for weight loss.  Patient then states it doesn't work for her and it's not going to work for her??.  Saxenda was prescribed for her in September but insurance denied coverage\par -educated that she is welcome to seek second opinion for endocrine and she has already been seen by bariatric surgery who stated she does not meet BMI criteria for weight loss surgery.  Patient is going to to go another bariatric surgeon\par -I discussed that weight loss medicines are available but her insurance has denied coverage. She has declined topiramate today\par \par Patient left upset and frustrated.   We checked labs and I will discuss results with her tomorrow. [Weight Loss] : weight loss

## 2022-11-07 NOTE — REASON FOR VISIT
[Follow - Up] : a follow-up visit [Other: ______] : provided by GILLIAN [Time Spent: ____ minutes] : Total time spent using  services: [unfilled] minutes. The patient's primary language is not English thus required  services. [Interpreters_IDNumber] : [id number] [Interpreters_FullName] : John [TWNoteComboBox1] : [place]

## 2022-11-08 LAB
T4 FREE SERPL-MCNC: 0.7 NG/DL
TSH SERPL-ACNC: 8.04 UIU/ML

## 2022-11-08 RX ORDER — METHIMAZOLE 5 MG/1
5 TABLET ORAL DAILY
Qty: 30 | Refills: 0 | Status: DISCONTINUED | COMMUNITY
Start: 2022-09-16 | End: 2022-11-08

## 2022-11-08 RX ORDER — BUPROPION HYDROCHLORIDE 75 MG/1
75 TABLET, FILM COATED ORAL DAILY
Qty: 30 | Refills: 0 | Status: ACTIVE | COMMUNITY
Start: 2022-11-08 | End: 1900-01-01

## 2022-11-10 ENCOUNTER — NON-APPOINTMENT (OUTPATIENT)
Age: 37
End: 2022-11-10

## 2022-11-10 LAB — TSI ACT/NOR SER: <0.1 IU/L

## 2022-11-11 LAB — TSH RECEPTOR AB: <1.1 IU/L
